# Patient Record
Sex: MALE | Race: WHITE | NOT HISPANIC OR LATINO | Employment: OTHER | RURAL
[De-identification: names, ages, dates, MRNs, and addresses within clinical notes are randomized per-mention and may not be internally consistent; named-entity substitution may affect disease eponyms.]

---

## 2022-03-25 ENCOUNTER — HOSPITAL ENCOUNTER (EMERGENCY)
Facility: HOSPITAL | Age: 47
Discharge: HOME | End: 2022-03-25
Payer: MEDICAID

## 2022-03-25 VITALS
OXYGEN SATURATION: 100 % | HEIGHT: 70 IN | SYSTOLIC BLOOD PRESSURE: 139 MMHG | HEART RATE: 98 BPM | DIASTOLIC BLOOD PRESSURE: 92 MMHG | RESPIRATION RATE: 19 BRPM | BODY MASS INDEX: 20.33 KG/M2 | WEIGHT: 142 LBS | TEMPERATURE: 98 F

## 2022-03-25 DIAGNOSIS — S62.514A CLOSED NONDISPLACED FRACTURE OF PROXIMAL PHALANX OF RIGHT THUMB, INITIAL ENCOUNTER: ICD-10-CM
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
## 2022-03-25 PROCEDURE — 99283 EMERGENCY DEPT VISIT LOW MDM: CPT | Mod: 25,,, | Performed by: NURSE PRACTITIONER

## 2022-03-25 PROCEDURE — 29125 APPL SHORT ARM SPLINT STATIC: CPT | Mod: RT,,, | Performed by: NURSE PRACTITIONER

## 2022-03-25 PROCEDURE — 99283 EMERGENCY DEPT VISIT LOW MDM: CPT

## 2022-03-25 PROCEDURE — 99283 PR EMERGENCY DEPT VISIT,LEVEL III: ICD-10-PCS | Mod: 25,,, | Performed by: NURSE PRACTITIONER

## 2022-03-25 PROCEDURE — 29125 PR APPLY FOREARM SPLINT,STATIC: ICD-10-PCS | Mod: RT,,, | Performed by: NURSE PRACTITIONER

## 2022-03-25 RX ORDER — PANTOPRAZOLE SODIUM 40 MG/1
40 TABLET, DELAYED RELEASE ORAL DAILY
COMMUNITY
Start: 2022-03-23

## 2022-03-25 RX ORDER — ETODOLAC 500 MG/1
TABLET, FILM COATED ORAL
COMMUNITY
Start: 2021-07-20

## 2022-03-25 RX ORDER — HYDROCODONE BITARTRATE AND ACETAMINOPHEN 5; 325 MG/1; MG/1
1 TABLET ORAL EVERY 4 HOURS PRN
Qty: 10 TABLET | Refills: 0 | Status: SHIPPED | OUTPATIENT
Start: 2022-03-25

## 2022-03-25 RX ORDER — LANOLIN ALCOHOL/MO/W.PET/CERES
CREAM (GRAM) TOPICAL
COMMUNITY

## 2022-03-25 RX ORDER — ASPIRIN 81 MG/1
TABLET ORAL
COMMUNITY

## 2022-03-25 RX ORDER — AMLODIPINE BESYLATE 10 MG/1
TABLET ORAL
COMMUNITY

## 2022-03-25 RX ORDER — SPIRONOLACTONE 25 MG/1
TABLET ORAL
COMMUNITY

## 2022-03-25 RX ORDER — HYDROCHLOROTHIAZIDE 25 MG/1
25 TABLET ORAL DAILY
COMMUNITY
Start: 2022-03-23

## 2022-03-25 RX ORDER — MUPIROCIN 20 MG/G
OINTMENT TOPICAL
COMMUNITY
Start: 2022-03-23

## 2022-03-25 NOTE — DISCHARGE INSTRUCTIONS
Wear splint and sling as directed. Take ibuprofen or tylenol as directed for pain - if unrelieved may take norco but don't take Norco within 4 hours of tylenol  Apply ice every 2-3 hours for comfort.  Follow up with Dr. Lara Monday at 9:10 am.  Take xray disc with you.

## 2022-03-25 NOTE — ED PROVIDER NOTES
Encounter Date: 3/25/2022       History     Chief Complaint   Patient presents with    Hand Pain    Abrasion     Right thumb pain and swelling, abrasion to left side of face s/p fall last PM, tried to prevent fall with right hand, hyper-extended right thumb     46 yr old WM to ED with c/o pain and swelling to the right hand s/p fell over dresser yesterday    The history is provided by the patient.     Review of patient's allergies indicates:   Allergen Reactions    Penicillin      Other reaction(s): Unknown     Past Medical History:   Diagnosis Date    CHF (congestive heart failure)     Coronary artery disease     Hypertension      History reviewed. No pertinent surgical history.  History reviewed. No pertinent family history.  Social History     Tobacco Use    Smoking status: Former Smoker    Smokeless tobacco: Current User     Types: Snuff   Substance Use Topics    Alcohol use: Yes     Alcohol/week: 24.0 standard drinks     Types: 24 Cans of beer per week    Drug use: Not Currently     Review of Systems   Constitutional: Negative for activity change and fatigue.   HENT: Negative.    Respiratory: Negative.    Cardiovascular: Negative.    Musculoskeletal:        Pain to right hand / thumb   Skin: Negative.        Physical Exam     Initial Vitals [03/25/22 1407]   BP Pulse Resp Temp SpO2   (!) 139/92 98 19 97.6 °F (36.4 °C) 100 %      MAP       --         Physical Exam    Nursing note and vitals reviewed.  Constitutional: He appears well-developed and well-nourished.   Neck: Neck supple.   Cardiovascular: Normal rate and regular rhythm.   Pulmonary/Chest: Breath sounds normal.   Musculoskeletal:         General: Edema present.      Cervical back: Neck supple.      Comments: To right hand     Skin: Skin is warm and dry.         Medical Screening Exam   See Full Note    ED Course   Orthopedic Injury    Date/Time: 3/25/2022 3:00 PM  Performed by: VIVIANE Whalen  Authorized by: VIVIANE Whalen      Location procedure was performed:  [facility] EMERGENCY DEPARTMENT  Assisting Provider:  VIVIANE Whalen  Pre-operative diagnosis:  Fracture right 1st digit proximal phalanx  Post-operative diagnosis:  Fracture right 1st digit proximal phalanx  Injury:     Injury location:  Hand    Injury type:  Fracture    Fracture type: first metacarpal        Pre-procedure assessment:     Neurovascular status: Neurovascularly intact      Local anesthesia used?: No      Patient sedated?: No        Procedure details:     Description of findings:  Neurovasc intact  Selections made in this section will also lock the Injury type section above.:     Immobilization:  Splint    Splint type:  Thumb spica    Technical Procedures Used:  Na    Significant surgical tasks conducted by the assistant(s):  Na    Complications: No      Estimated blood loss (mL):  0    Specimens: No      Implants: No    Post-procedure assessment:     Neurovascular status: Neurovascularly intact        Labs Reviewed - No data to display       Imaging Results          X-Ray Hand 3 view Right (Final result)  Result time 03/25/22 14:38:12    Final result by Saul Humphreys II, MD (03/25/22 14:38:12)                 Impression:      First metacarpal fracture as described above      Electronically signed by: Saul Humphreys  Date:    03/25/2022  Time:    14:38             Narrative:    EXAMINATION:  XR HAND COMPLETE 3 VIEW RIGHT    CLINICAL HISTORY:  fall;    COMPARISON:  None available    FINDINGS:  There is suggestion of nondisplaced fracture of the proximal phalanx 1st digit, detail is limited evidence of fracture seen.  The alignment of the joints appears normal.  Mild-to-moderate wrist and 1st metacarpal phalangeal joint degenerative change is present.  No soft tissue abnormality is seen.                                 Medications - No data to display              ED Course as of 03/25/22 1523   Fri Mar 25, 2022   1446 Discussed pt with Dr. Lara, on  call for Ortho who reports to splint and he will see in clinic  [CG]   1449 Called for appt with ortho 3/28 Monday at 9:10 AM [CG]      ED Course User Index  [CG] VIVIANE Whalen          Clinical Impression:   Final diagnoses:  [W19.XXXA] Fall, initial encounter (Primary)  [S62.514A] Closed nondisplaced fracture of proximal phalanx of right thumb, initial encounter          ED Disposition Condition    Discharge Stable        ED Prescriptions     Medication Sig Dispense Start Date End Date Auth. Provider    HYDROcodone-acetaminophen (NORCO) 5-325 mg per tablet Take 1 tablet by mouth every 4 (four) hours as needed for Pain. 10 tablet 3/25/2022  VIVIANE Whalen        Follow-up Information     Follow up With Specialties Details Why Contact Info    Gelacio Lara MD Orthopedic Surgery Go on 3/28/2022  [address]   [address]  Gelacio Lara Md, Orthopedic [facility] Sports Medicine, [facility] [address]  [phone]             VIVIANE Whalen  03/25/22 1525

## 2022-03-28 PROBLEM — S63.641A GAMEKEEPER'S THUMB OF RIGHT HAND: Status: ACTIVE | Noted: 2022-03-28

## 2024-08-30 ENCOUNTER — HOSPITAL ENCOUNTER (EMERGENCY)
Facility: HOSPITAL | Age: 49
Discharge: HOME | End: 2024-08-30
Payer: MEDICAID

## 2024-08-30 VITALS
OXYGEN SATURATION: 100 % | TEMPERATURE: 98 F | BODY MASS INDEX: 21.35 KG/M2 | RESPIRATION RATE: 16 BRPM | HEART RATE: 93 BPM | DIASTOLIC BLOOD PRESSURE: 99 MMHG | WEIGHT: 136 LBS | HEIGHT: 67 IN | SYSTOLIC BLOOD PRESSURE: 136 MMHG

## 2024-08-30 DIAGNOSIS — S61.213A LACERATION OF LEFT MIDDLE FINGER WITHOUT FOREIGN BODY WITHOUT DAMAGE TO NAIL, INITIAL ENCOUNTER: Primary | ICD-10-CM

## 2024-08-30 PROCEDURE — 99284 EMERGENCY DEPT VISIT MOD MDM: CPT | Mod: 25,,, | Performed by: NURSE PRACTITIONER

## 2024-08-30 PROCEDURE — 25000003 PHARM REV CODE 250: Performed by: NURSE PRACTITIONER

## 2024-08-30 PROCEDURE — 12002 RPR S/N/AX/GEN/TRNK2.6-7.5CM: CPT

## 2024-08-30 PROCEDURE — 63600175 PHARM REV CODE 636 W HCPCS: Performed by: NURSE PRACTITIONER

## 2024-08-30 PROCEDURE — 90715 TDAP VACCINE 7 YRS/> IM: CPT | Performed by: NURSE PRACTITIONER

## 2024-08-30 PROCEDURE — 99284 EMERGENCY DEPT VISIT MOD MDM: CPT | Mod: 25

## 2024-08-30 PROCEDURE — 90471 IMMUNIZATION ADMIN: CPT | Performed by: NURSE PRACTITIONER

## 2024-08-30 PROCEDURE — 12002 RPR S/N/AX/GEN/TRNK2.6-7.5CM: CPT | Mod: ,,, | Performed by: NURSE PRACTITIONER

## 2024-08-30 RX ORDER — LIDOCAINE HYDROCHLORIDE 10 MG/ML
10 INJECTION, SOLUTION INFILTRATION; PERINEURAL
Status: COMPLETED | OUTPATIENT
Start: 2024-08-30 | End: 2024-08-30

## 2024-08-30 RX ORDER — CLINDAMYCIN HYDROCHLORIDE 300 MG/1
300 CAPSULE ORAL EVERY 8 HOURS
Qty: 21 CAPSULE | Refills: 0 | Status: SHIPPED | OUTPATIENT
Start: 2024-08-30

## 2024-08-30 RX ORDER — LIDOCAINE HYDROCHLORIDE 10 MG/ML
5 INJECTION, SOLUTION INFILTRATION; PERINEURAL
Status: DISCONTINUED | OUTPATIENT
Start: 2024-08-30 | End: 2024-08-30 | Stop reason: HOSPADM

## 2024-08-30 RX ADMIN — TETANUS TOXOID, REDUCED DIPHTHERIA TOXOID AND ACELLULAR PERTUSSIS VACCINE, ADSORBED 0.5 ML: 5; 2.5; 8; 8; 2.5 SUSPENSION INTRAMUSCULAR at 10:08

## 2024-08-30 RX ADMIN — LIDOCAINE HYDROCHLORIDE 10 ML: 10 INJECTION, SOLUTION INFILTRATION; PERINEURAL at 10:08

## 2024-08-30 RX ADMIN — BACITRACIN ZINC, NEOMYCIN SULFATE , POLYMYXIN B SULFATE. 1 EACH: 400; 3.5; 5 OINTMENT TOPICAL at 10:08

## 2024-08-30 NOTE — ED TRIAGE NOTES
Pt presents to the ED via POV w/ c/o lacerations on left ring and middle finger that occurred last night when pt cut his hand on piece on glass. Tetanus not up to date.    unk

## 2024-08-30 NOTE — ED PROVIDER NOTES
Encounter Date: 8/30/2024       History     Chief Complaint   Patient presents with    Laceration     Middle and ring finger     50 y/o WM presents pov with c/o lacerations to left index and middle fingers. Laceration occurred last night while trying to install a window pane. Patient states he didn't come to ED last night because he had been drinking alcohol and couldn't drive.        Review of patient's allergies indicates:   Allergen Reactions    Penicillin      Other reaction(s): Unknown     Past Medical History:   Diagnosis Date    CHF (congestive heart failure)     Coronary artery disease     Hypertension      History reviewed. No pertinent surgical history.  No family history on file.  Social History     Tobacco Use    Smoking status: Former    Smokeless tobacco: Current     Types: Snuff   Substance Use Topics    Alcohol use: Yes     Alcohol/week: 40.0 standard drinks of alcohol     Types: 40 Cans of beer per week    Drug use: Not Currently     Review of Systems   Constitutional:  Negative for chills and fever.   Respiratory: Negative.  Negative for apnea, cough, choking, chest tightness, shortness of breath, wheezing and stridor.    Cardiovascular: Negative.  Negative for chest pain, palpitations and leg swelling.   Gastrointestinal: Negative.    Genitourinary: Negative.    Musculoskeletal: Negative.    Skin:  Positive for wound.        Lacerations left index/middle fingers   Neurological: Negative.    Psychiatric/Behavioral: Negative.     All other systems reviewed and are negative.      Physical Exam     Initial Vitals [08/30/24 0948]   BP Pulse Resp Temp SpO2   (!) 136/99 93 16 97.7 °F (36.5 °C) 100 %      MAP       --         Physical Exam    Nursing note and vitals reviewed.  Constitutional: He appears well-developed and well-nourished. No distress.   Cardiovascular:  Normal rate, regular rhythm, normal heart sounds and intact distal pulses.     Exam reveals no gallop and no friction rub.       No murmur  heard.  Pulmonary/Chest: Breath sounds normal. No respiratory distress. He has no wheezes. He has no rhonchi. He has no rales. He exhibits no tenderness.   Musculoskeletal:         General: No tenderness. Normal range of motion.     Neurological: He is alert and oriented to person, place, and time. He has normal strength.   Skin: Skin is warm and dry. Capillary refill takes less than 2 seconds. Laceration noted.        Psychiatric: He has a normal mood and affect. His behavior is normal. Judgment and thought content normal.         Medical Screening Exam   See Full Note    ED Course   Lac Repair    Date/Time: 8/30/2024 10:04 AM    Performed by: Roque Perez FNP  Authorized by: Roque Perez FNP    Consent:     Consent obtained:  Verbal    Consent given by:  Patient    Risks, benefits, and alternatives were discussed: yes      Risks discussed:  Infection, need for additional repair, tendon damage, nerve damage and pain    Alternatives discussed:  Referral  Castor protocol:     Procedure explained and questions answered to patient or proxy's satisfaction: yes      Immediately prior to procedure, a time out was called: yes      Patient identity confirmed:  Verbally with patient and arm band  Anesthesia:     Anesthesia method:  Local infiltration    Local anesthetic:  Lidocaine 1% w/o epi  Laceration details:     Location:  Finger    Finger location:  L long finger    Length (cm):  7    Depth (mm):  3  Pre-procedure details:     Preparation:  Patient was prepped and draped in usual sterile fashion  Exploration:     Limited defect created (wound extended): no      Hemostasis achieved with:  Direct pressure    Imaging outcome: foreign body not noted      Wound exploration: wound explored through full range of motion and entire depth of wound visualized      Wound extent: areolar tissue violated      Contaminated: yes    Treatment:     Area cleansed with:  Povidone-iodine and saline    Amount of cleaning:   Standard    Irrigation solution:  Sterile saline    Irrigation volume:  50cc    Irrigation method:  Syringe    Visualized foreign bodies/material removed: no      Debridement:  None    Undermining:  None    Scar revision: no    Skin repair:     Repair method:  Sutures    Suture size:  4-0    Suture material:  Nylon    Suture technique:  Simple interrupted    Number of sutures:  19  Approximation:     Approximation:  Close  Repair type:     Repair type:  Simple  Post-procedure details:     Dressing:  Antibiotic ointment and sterile dressing    Procedure completion:  Tolerated well, no immediate complications    Labs Reviewed - No data to display       Imaging Results    None          Medications   LIDOcaine HCL 10 mg/ml (1%) injection 5 mL (has no administration in time range)   LIDOcaine HCL 10 mg/ml (1%) injection 10 mL (10 mLs Infiltration Given 8/30/24 1003)   neomycin-bacitracnZn-polymyxnB packet (1 each Topical (Top) Given 8/30/24 1003)   Tdap (BOOSTRIX) vaccine injection 0.5 mL (0.5 mLs Intramuscular Given 8/30/24 1004)     Medical Decision Making  48 y/o WM presents pov with c/o lacerations to left ring and middle fingers. Laceration occurred last night while trying to install a window pane. Patient states he didn't come to ED last night because he had been drinking alcohol and couldn't drive.    Risk  OTC drugs.  Prescription drug management.                                      Clinical Impression:   Final diagnoses:  [S61.213A] Laceration of left middle finger without foreign body without damage to nail, initial encounter (Primary)        ED Disposition Condition    Discharge Stable          ED Prescriptions       Medication Sig Dispense Start Date End Date Auth. Provider    clindamycin (CLEOCIN) 300 MG capsule Take 1 capsule (300 mg total) by mouth every 8 (eight) hours. 21 capsule 8/30/2024 -- Roque Perez FNP          Follow-up Information       Follow up With Specialties Details Why Contact Info     Alexia Boo, [facility] In 10 days For suture removal [address] [place] [address]  [place] [address]  [phone]               Roque Perez FNP  08/30/24 1109       Roque Perez FNP  08/30/24 1109